# Patient Record
Sex: MALE | Race: WHITE | URBAN - METROPOLITAN AREA
[De-identification: names, ages, dates, MRNs, and addresses within clinical notes are randomized per-mention and may not be internally consistent; named-entity substitution may affect disease eponyms.]

---

## 2017-03-08 ENCOUNTER — OFFICE VISIT (OUTPATIENT)
Dept: SURGERY | Facility: CLINIC | Age: 27
End: 2017-03-08
Payer: COMMERCIAL

## 2017-03-08 VITALS
HEIGHT: 74 IN | SYSTOLIC BLOOD PRESSURE: 151 MMHG | DIASTOLIC BLOOD PRESSURE: 61 MMHG | WEIGHT: 240 LBS | BODY MASS INDEX: 30.8 KG/M2 | HEART RATE: 70 BPM

## 2017-03-08 DIAGNOSIS — R10.32 LEFT GROIN PAIN: Primary | ICD-10-CM

## 2017-03-08 PROCEDURE — 99203 OFFICE O/P NEW LOW 30 MIN: CPT | Performed by: SURGERY

## 2017-03-08 NOTE — MR AVS SNAPSHOT
"              After Visit Summary   3/8/2017    Jb Ramos    MRN: [id number]           Patient Information     Date Of Birth          1990        Visit Information        Provider Department      3/8/2017 1:00 PM Corey Braun MD [facility]       Follow-ups after your visit        Who to contact     If you have questions or need follow up information about today's clinic visit or your schedule please contact [facility] directly at [phone].  Normal or non-critical lab and imaging results will be communicated to you by Mobicioushart, letter or phone within 4 business days after the clinic has received the results. If you do not hear from us within 7 days, please contact the clinic through Mobicioushart or phone. If you have a critical or abnormal lab result, we will notify you by phone as soon as possible.  Submit refill requests through Runtastic or call your pharmacy and they will forward the refill request to us. Please allow 3 business days for your refill to be completed.          Additional Information About Your Visit        MyChart Information     Runtastic lets you send messages to your doctor, view your test results, renew your prescriptions, schedule appointments and more. To sign up, go to [email or web address] . Click on \"Log in\" on the left side of the screen, which will take you to the Welcome page. Then click on \"Sign up Now\" on the right side of the page.     You will be asked to enter the access code listed below, as well as some personal information. Please follow the directions to create your username and password.     Your access code is: [email or web address]  Expires: 2017  1:08 PM     Your access code will  in 90 days. If you need help or a new code, please call your [place] clinic or [phone].        Care EveryWhere ID     This is your Care EveryWhere ID. This could be used by other organizations to " "access your [place] medical records  [id number]        Your Vitals Were     Pulse Height BMI (Body Mass Index)             70 6' 2\" (1.88 m) 30.81 kg/m2          Blood Pressure from Last 3 Encounters:   03/08/17 151/61    Weight from Last 3 Encounters:   03/08/17 240 lb (108.9 kg)              Today, you had the following     No orders found for display       Primary Care Provider Office Phone # Fax #    Yvan Anderson PA-C [phone]       [facility] [address]  [facility] [address]        Thank you!     Thank you for choosing [facility]  for your care. Our goal is always to provide you with excellent care. Hearing back from our patients is one way we can continue to improve our services. Please take a few minutes to complete the written survey that you may receive in the mail after your visit with us. Thank you!             Your Updated Medication List - Protect others around you: Learn how to safely use, store and throw away your medicines at www.disposemymeds.org.          This list is accurate as of: 3/8/17  1:08 PM.  Always use your most recent med list.                   Brand Name Dispense Instructions for use    FLUOXETINE HCL PO      Take 10 mg by mouth         "

## 2017-03-08 NOTE — LETTER
"  2017    [facility]  Surgery Consultation    RE:  Jb Ramos-:  [birth year]     CONSULTATION REQUESTED BY: Xavi Castillo  PCP: Yvan Anderson PA-C     HPI: This patient is 26 year old gentleman referred for consultation regarding possible left-sided sports hernia. He reports that his symptoms began approximately eight weeks ago. He thought that this represented an adductor strain. He however did not note any real specific injury. He thought that this was perhaps overuse phenomenon. At the time of injury or development of symptoms he was performing a great deal of weight lifting including jerks and squats. He describes discomfort near his pubic bone on the left-hand side. He reports that this is one or two out of 10 at rest and has been persistent since. He has had stopped activity. He has persistent discomfort with running or Rowing. He has no specific pain on coughing or sneezing. No testicular discomfort. He has some discomfort with leg raise as well as sit up. His symptoms have been resistant to stem therapy or ultrasound treatments. He reports that there has been no significant improvement over time. He was evaluated at [facility] orthopedic [place] and referred for consultation regarding possible sports hernia.     PMH:  has no past medical history on file.  PSH:  has no past surgical history on file.  Social History:  reports that he has never smoked. He does not have any smokeless tobacco history on file.  Family History: family history is not on file.  Medications/Allergies: Home medications and allergies reviewed.     ROS: The 10 point Review of Systems is negative other than noted in the HPI.     Physical Exam:  /61 Pulse 70 Ht 6' 2\" (1.88 m) Wt 240 lb (108.9 kg) BMI 30.81 kg/m2  GENERAL: Generally appears well.  Psych: Alert and Oriented. Normal affect  Eyes: Sclera clear  Respiratory: Lungs clear to ausculation bilaterally with good air excursion  Cardiovascular: " Regular Rate and Rhythm with no murmurs gallops or rubs, normal peripheral pulses  GI: Abdomen Non Distended Non-Tender No hernias palpated..  Groin- I examined the patient in both the standing and supine positions. Right Groin- No hernia Palpated. No tenderness on palpation. Left Groin- No hernia Palpated. No tenderness on palpation. No scrotal or testicle abnormalities. Area of discomfort appears to be localized Very much to the proximal portion of the left adductor tendon. This pain is exacerbated by both leg raise as well as forced adduction There is no specific discomfort within the inguinal canals.  Lymphatic/Hematologic/Immune: No femoral or cervical lymphadenopathy.  Integumentary: No rashes  Neurological: grossly intact      All new lab and imaging data was reviewed.      Impression and Plan:  Patient is a 26 year old male with Left adductor pain     PLAN: I discussed with him that this is not consistent with sports hernia. We reviewed the symptoms that would be consistent with sports hernia. He agrees that his symptoms are different. He would be referred for orthopedic evaluation to determine whether or not he is a candidate for tendon lengthening surgery.        Thank you very much for this consult.     Corey Braun M.D.  [facility]  [phone]

## 2017-03-08 NOTE — PROGRESS NOTES
"[facility]  Surgery Consultation    CONSULTATION REQUESTED BY:  Xavi Castillo  PCP: Yvan Anderson PA-C    HPI: This patient is 26 year old gentleman referred for consultation regarding possible left-sided sports hernia.  He reports that his symptoms began approximately eight weeks ago.  He thought that this represented an adductor strain.  He however did not note any real specific injury.  He thought that this was perhaps overuse phenomenon. At the time of injury or development of symptoms he was performing a great deal of weight lifting including jerks and squats.  He describes discomfort near his pubic bone on the left-hand side.  He reports that this is one or two out of 10 at rest and has been persistent since.  He has had stopped activity.  He has persistent discomfort with running or Rowing.  He has no specific pain on coughing or sneezing. No testicular discomfort.  He has some discomfort with leg raise as well as sit up.  His symptoms have been resistant to stem therapy or ultrasound treatments.  He reports that there has been no significant improvement over time.  He was evaluated at [facility] orthopedic [place] and referred for consultation regarding possible sports hernia.    PMH:   has no past medical history on file.  PSH:    has no past surgical history on file.  Social History:   reports that he has never smoked. He does not have any smokeless tobacco history on file.  Family History:  family history is not on file.  Medications/Allergies: Home medications and allergies reviewed.    ROS:  The 10 point Review of Systems is negative other than noted in the HPI.    Physical Exam:  /61  Pulse 70  Ht 6' 2\" (1.88 m)  Wt 240 lb (108.9 kg)  BMI 30.81 kg/m2  GENERAL: Generally appears well.  Psych: Alert and Oriented.  Normal affect  Eyes: Sclera clear  Respiratory:  Lungs clear to ausculation bilaterally with good air excursion  Cardiovascular:  Regular Rate and Rhythm with no murmurs " gallops or rubs, normal peripheral pulses  GI: Abdomen Non Distended Non-Tender  No hernias palpated..  Groin- I examined the patient in both the standing and supine positions. Right Groin- No hernia Palpated.  No tenderness on palpation. Left Groin- No hernia Palpated.  No tenderness on palpation. No scrotal or testicle abnormalities. Area of discomfort appears to be localized  Very much to the proximal portion of the left adductor tendon.  This pain is exacerbated by both leg raise as well as forced adduction  There is no specific discomfort within the inguinal canals.  Lymphatic/Hematologic/Immune:  No femoral or cervical lymphadenopathy.  Integumentary:  No rashes  Neurological: grossly intact     All new lab and imaging data was reviewed.     Impression and Plan:  Patient is a 26 year old male with Left adductor pain    PLAN: I discussed with him that this is not consistent with sports hernia.  We reviewed the symptoms that would be consistent with sports hernia.  He agrees that his symptoms are different.  He would be referred for orthopedic evaluation to determine whether or not he is a candidate for tendon lengthening surgery.      Thank you very much for this consult.    Corey Braun M.D.  [facility]  [phone]    Please route or send letter to:  Primary Care Provider (PCP) and Referring Provider